# Patient Record
Sex: FEMALE | Race: WHITE | NOT HISPANIC OR LATINO | URBAN - METROPOLITAN AREA
[De-identification: names, ages, dates, MRNs, and addresses within clinical notes are randomized per-mention and may not be internally consistent; named-entity substitution may affect disease eponyms.]

---

## 2019-07-01 ENCOUNTER — INPATIENT (INPATIENT)
Facility: HOSPITAL | Age: 74
LOS: 0 days | Discharge: ANOTHER HOSPITAL | DRG: 305 | End: 2019-07-02
Attending: INTERNAL MEDICINE | Admitting: INTERNAL MEDICINE
Payer: COMMERCIAL

## 2019-07-01 VITALS
WEIGHT: 169.98 LBS | TEMPERATURE: 97 F | SYSTOLIC BLOOD PRESSURE: 177 MMHG | RESPIRATION RATE: 18 BRPM | OXYGEN SATURATION: 99 % | HEART RATE: 56 BPM | DIASTOLIC BLOOD PRESSURE: 80 MMHG

## 2019-07-01 DIAGNOSIS — I10 ESSENTIAL (PRIMARY) HYPERTENSION: ICD-10-CM

## 2019-07-01 DIAGNOSIS — I21.4 NON-ST ELEVATION (NSTEMI) MYOCARDIAL INFARCTION: ICD-10-CM

## 2019-07-01 DIAGNOSIS — E78.00 PURE HYPERCHOLESTEROLEMIA, UNSPECIFIED: ICD-10-CM

## 2019-07-01 DIAGNOSIS — Z29.9 ENCOUNTER FOR PROPHYLACTIC MEASURES, UNSPECIFIED: ICD-10-CM

## 2019-07-01 LAB
24R-OH-CALCIDIOL SERPL-MCNC: 23.5 NG/ML — LOW (ref 30–80)
ALBUMIN SERPL ELPH-MCNC: 4 G/DL — SIGNIFICANT CHANGE UP (ref 3.5–5)
ALP SERPL-CCNC: 107 U/L — SIGNIFICANT CHANGE UP (ref 40–120)
ALT FLD-CCNC: 25 U/L DA — SIGNIFICANT CHANGE UP (ref 10–60)
ANION GAP SERPL CALC-SCNC: 7 MMOL/L — SIGNIFICANT CHANGE UP (ref 5–17)
APTT BLD: 28.8 SEC — SIGNIFICANT CHANGE UP (ref 27.5–36.3)
AST SERPL-CCNC: 27 U/L — SIGNIFICANT CHANGE UP (ref 10–40)
BASOPHILS # BLD AUTO: 0.05 K/UL — SIGNIFICANT CHANGE UP (ref 0–0.2)
BASOPHILS NFR BLD AUTO: 0.8 % — SIGNIFICANT CHANGE UP (ref 0–2)
BILIRUB SERPL-MCNC: 0.3 MG/DL — SIGNIFICANT CHANGE UP (ref 0.2–1.2)
BUN SERPL-MCNC: 19 MG/DL — HIGH (ref 7–18)
CALCIUM SERPL-MCNC: 8.5 MG/DL — SIGNIFICANT CHANGE UP (ref 8.4–10.5)
CHLORIDE SERPL-SCNC: 105 MMOL/L — SIGNIFICANT CHANGE UP (ref 96–108)
CHOLEST SERPL-MCNC: 190 MG/DL — SIGNIFICANT CHANGE UP (ref 10–199)
CK MB BLD-MCNC: 6.4 % — HIGH (ref 0–3.5)
CK MB CFR SERPL CALC: 12.1 NG/ML — HIGH (ref 0–3.6)
CK SERPL-CCNC: 188 U/L — SIGNIFICANT CHANGE UP (ref 21–215)
CO2 SERPL-SCNC: 26 MMOL/L — SIGNIFICANT CHANGE UP (ref 22–31)
CREAT SERPL-MCNC: 0.91 MG/DL — SIGNIFICANT CHANGE UP (ref 0.5–1.3)
EOSINOPHIL # BLD AUTO: 0.09 K/UL — SIGNIFICANT CHANGE UP (ref 0–0.5)
EOSINOPHIL NFR BLD AUTO: 1.4 % — SIGNIFICANT CHANGE UP (ref 0–6)
GLUCOSE SERPL-MCNC: 103 MG/DL — HIGH (ref 70–99)
HBA1C BLD-MCNC: 5.5 % — SIGNIFICANT CHANGE UP (ref 4–5.6)
HCT VFR BLD CALC: 38 % — SIGNIFICANT CHANGE UP (ref 34.5–45)
HDLC SERPL-MCNC: 63 MG/DL — SIGNIFICANT CHANGE UP
HGB BLD-MCNC: 12.2 G/DL — SIGNIFICANT CHANGE UP (ref 11.5–15.5)
IMM GRANULOCYTES NFR BLD AUTO: 0.3 % — SIGNIFICANT CHANGE UP (ref 0–1.5)
INR BLD: 0.95 RATIO — SIGNIFICANT CHANGE UP (ref 0.88–1.16)
LIPID PNL WITH DIRECT LDL SERPL: 105 MG/DL — SIGNIFICANT CHANGE UP
LYMPHOCYTES # BLD AUTO: 1.37 K/UL — SIGNIFICANT CHANGE UP (ref 1–3.3)
LYMPHOCYTES # BLD AUTO: 21.3 % — SIGNIFICANT CHANGE UP (ref 13–44)
MCHC RBC-ENTMCNC: 30.4 PG — SIGNIFICANT CHANGE UP (ref 27–34)
MCHC RBC-ENTMCNC: 32.1 GM/DL — SIGNIFICANT CHANGE UP (ref 32–36)
MCV RBC AUTO: 94.8 FL — SIGNIFICANT CHANGE UP (ref 80–100)
MONOCYTES # BLD AUTO: 0.47 K/UL — SIGNIFICANT CHANGE UP (ref 0–0.9)
MONOCYTES NFR BLD AUTO: 7.3 % — SIGNIFICANT CHANGE UP (ref 2–14)
NEUTROPHILS # BLD AUTO: 4.43 K/UL — SIGNIFICANT CHANGE UP (ref 1.8–7.4)
NEUTROPHILS NFR BLD AUTO: 68.9 % — SIGNIFICANT CHANGE UP (ref 43–77)
NRBC # BLD: 0 /100 WBCS — SIGNIFICANT CHANGE UP (ref 0–0)
PLATELET # BLD AUTO: 190 K/UL — SIGNIFICANT CHANGE UP (ref 150–400)
POTASSIUM SERPL-MCNC: 3.8 MMOL/L — SIGNIFICANT CHANGE UP (ref 3.5–5.3)
POTASSIUM SERPL-SCNC: 3.8 MMOL/L — SIGNIFICANT CHANGE UP (ref 3.5–5.3)
PROT SERPL-MCNC: 7.2 G/DL — SIGNIFICANT CHANGE UP (ref 6–8.3)
PROTHROM AB SERPL-ACNC: 10.5 SEC — SIGNIFICANT CHANGE UP (ref 10–12.9)
RBC # BLD: 4.01 M/UL — SIGNIFICANT CHANGE UP (ref 3.8–5.2)
RBC # FLD: 12.5 % — SIGNIFICANT CHANGE UP (ref 10.3–14.5)
SODIUM SERPL-SCNC: 138 MMOL/L — SIGNIFICANT CHANGE UP (ref 135–145)
TOTAL CHOLESTEROL/HDL RATIO MEASUREMENT: 3 RATIO — LOW (ref 3.3–7.1)
TRIGL SERPL-MCNC: 109 MG/DL — SIGNIFICANT CHANGE UP (ref 10–149)
TROPONIN I SERPL-MCNC: 2.52 NG/ML — HIGH (ref 0–0.04)
TROPONIN I SERPL-MCNC: 3.51 NG/ML — HIGH (ref 0–0.04)
TSH SERPL-MCNC: 4.35 UU/ML — SIGNIFICANT CHANGE UP (ref 0.34–4.82)
VIT B12 SERPL-MCNC: 1311 PG/ML — HIGH (ref 232–1245)
WBC # BLD: 6.43 K/UL — SIGNIFICANT CHANGE UP (ref 3.8–10.5)
WBC # FLD AUTO: 6.43 K/UL — SIGNIFICANT CHANGE UP (ref 3.8–10.5)

## 2019-07-01 PROCEDURE — 71046 X-RAY EXAM CHEST 2 VIEWS: CPT | Mod: 26

## 2019-07-01 PROCEDURE — 99291 CRITICAL CARE FIRST HOUR: CPT

## 2019-07-01 RX ORDER — ASPIRIN/CALCIUM CARB/MAGNESIUM 324 MG
325 TABLET ORAL ONCE
Refills: 0 | Status: COMPLETED | OUTPATIENT
Start: 2019-07-01 | End: 2019-07-01

## 2019-07-01 RX ORDER — METOPROLOL TARTRATE 50 MG
12.5 TABLET ORAL EVERY 12 HOURS
Refills: 0 | Status: DISCONTINUED | OUTPATIENT
Start: 2019-07-01 | End: 2019-07-02

## 2019-07-01 RX ORDER — PANTOPRAZOLE SODIUM 20 MG/1
40 TABLET, DELAYED RELEASE ORAL
Refills: 0 | Status: DISCONTINUED | OUTPATIENT
Start: 2019-07-01 | End: 2019-07-02

## 2019-07-01 RX ORDER — SODIUM CHLORIDE 9 MG/ML
3 INJECTION INTRAMUSCULAR; INTRAVENOUS; SUBCUTANEOUS ONCE
Refills: 0 | Status: COMPLETED | OUTPATIENT
Start: 2019-07-01 | End: 2019-07-01

## 2019-07-01 RX ORDER — METOPROLOL TARTRATE 50 MG
12.5 TABLET ORAL DAILY
Refills: 0 | Status: DISCONTINUED | OUTPATIENT
Start: 2019-07-01 | End: 2019-07-01

## 2019-07-01 RX ORDER — ENOXAPARIN SODIUM 100 MG/ML
80 INJECTION SUBCUTANEOUS ONCE
Refills: 0 | Status: COMPLETED | OUTPATIENT
Start: 2019-07-01 | End: 2019-07-01

## 2019-07-01 RX ORDER — ATORVASTATIN CALCIUM 80 MG/1
40 TABLET, FILM COATED ORAL AT BEDTIME
Refills: 0 | Status: DISCONTINUED | OUTPATIENT
Start: 2019-07-01 | End: 2019-07-02

## 2019-07-01 RX ORDER — ASPIRIN/CALCIUM CARB/MAGNESIUM 324 MG
81 TABLET ORAL DAILY
Refills: 0 | Status: DISCONTINUED | OUTPATIENT
Start: 2019-07-01 | End: 2019-07-02

## 2019-07-01 RX ORDER — ENOXAPARIN SODIUM 100 MG/ML
80 INJECTION SUBCUTANEOUS
Refills: 0 | Status: DISCONTINUED | OUTPATIENT
Start: 2019-07-01 | End: 2019-07-01

## 2019-07-01 RX ADMIN — SODIUM CHLORIDE 3 MILLILITER(S): 9 INJECTION INTRAMUSCULAR; INTRAVENOUS; SUBCUTANEOUS at 04:34

## 2019-07-01 RX ADMIN — ENOXAPARIN SODIUM 80 MILLIGRAM(S): 100 INJECTION SUBCUTANEOUS at 11:07

## 2019-07-01 RX ADMIN — ENOXAPARIN SODIUM 80 MILLIGRAM(S): 100 INJECTION SUBCUTANEOUS at 04:38

## 2019-07-01 RX ADMIN — Medication 81 MILLIGRAM(S): at 11:06

## 2019-07-01 RX ADMIN — ATORVASTATIN CALCIUM 40 MILLIGRAM(S): 80 TABLET, FILM COATED ORAL at 21:54

## 2019-07-01 RX ADMIN — Medication 325 MILLIGRAM(S): at 04:37

## 2019-07-01 RX ADMIN — PANTOPRAZOLE SODIUM 40 MILLIGRAM(S): 20 TABLET, DELAYED RELEASE ORAL at 18:13

## 2019-07-01 RX ADMIN — Medication 12.5 MILLIGRAM(S): at 18:11

## 2019-07-01 NOTE — H&P ADULT - NEGATIVE OPHTHALMOLOGIC SYMPTOMS
no photophobia/no lacrimation R/no blurred vision L/no blurred vision R/no lacrimation L/no diplopia

## 2019-07-01 NOTE — H&P ADULT - NSHPSOCIALHISTORY_GEN_ALL_CORE
Pt lives alone at an apartment. Works as a home . She has never smoked cigarettes. She occasionally drinks alcohol.

## 2019-07-01 NOTE — ED PROVIDER NOTE - CLINICAL SUMMARY MEDICAL DECISION MAKING FREE TEXT BOX
Will check labs, ECG, CXR, cardiac enzymes, well appearing in NAD.  Will also check duplex of LLE given pain.

## 2019-07-01 NOTE — CONSULT NOTE ADULT - SUBJECTIVE AND OBJECTIVE BOX
Patient is a 73y old  Female who presents with a chief complaint of Chest pain (01 Jul 2019 11:59)    History of Present Illness:  Reason for Admission: Chest pain	  History of Present Illness: 	  Patient is a 74 y/o F  from home, with PMH of HTN, HLD, urinary incontinence who presented epigastric pain since last night. According to the patient she first noticed mild chest pain after dancing on a party which she ignored, but last night  pain was worse about 7/10 intensity without radiation along with some kind of funny sensation of chest and occasional numbness of left arm.  She also felt mild lightheadedness while trying to lie down on a bed. Pain is aggravated by walking and relieved by taking deep breath as per the patient. She mentioned that she has light headache, nausea  and generalized weakness. She denied shortness of breath, abdominal pain, vomiting, fever, chills.  No recent history of travel or immobilisation.  All other ROS negative except above mentioned.    pt seen in icu [  ], reg med floor [ x  ], bed [ x ], chair at bedside [   ], a+o x3 [ x ], lethargic [  ],  nad [x  ]    enriquez [  ], ngt [  ], peg [  ], et tube [  ], cent line [  ], picc line [  ]        Allergies    penicillin (Hives)        Vitals    T(F): 97.2 (07-01-19 @ 11:21), Max: 97.9 (07-01-19 @ 06:12)  HR: 57 (07-01-19 @ 11:21) (49 - 57)  BP: 134/63 (07-01-19 @ 11:21) (134/63 - 177/80)  RR: 17 (07-01-19 @ 11:21) (17 - 18)  SpO2: 100% (07-01-19 @ 11:21) (98% - 100%)  Wt(kg): --  CAPILLARY BLOOD GLUCOSE          Labs                          12.2   6.43  )-----------( 190      ( 01 Jul 2019 04:04 )             38.0       07-01    138  |  105  |  19<H>  ----------------------------<  103<H>  3.8   |  26  |  0.91    Ca    8.5      01 Jul 2019 04:05    TPro  7.2  /  Alb  4.0  /  TBili  0.3  /  DBili  x   /  AST  27  /  ALT  25  /  AlkPhos  107  07-01      CARDIAC MARKERS ( 01 Jul 2019 09:28 )  2.520 ng/mL / x     / 188 U/L / x     / 12.1 ng/mL  CARDIAC MARKERS ( 01 Jul 2019 04:05 )  3.510 ng/mL / x     / x     / x     / x          Troponin I, Serum (07.01.19 @ 09:28)    Troponin I, Serum: 2.520: The new reference range for Troponin-I performed on the Siemens Vista  system is 0.015-0.045 ng/mL, which includes the 99th percentile of a  healthy reference population. Studies have shown that elevated troponin  levels above the 99th percentile cutoff are associated with an increased  risk for adverse cardiac events, with the risk increasing as troponin  levels increase. As per a joint committee of the American College of  Cardiology and European Society of Cardiology, diagnosis of classic MI is  based upon the detection of a rise or fall of cardiac troponin values,  with at least one value above the 99th percentile upper reference limit,  in the appropriate clinical context.  Troponin-I (ng/mL) Interpretation  0.00-0.045 Normal range (includes the 99th percentile of a healthy  reference population)  >0.045 Elevated troponin level indicating increased risk  Note: Troponin-I and Troponin-T cannot be used interchangeably in serial  measurements. Minimally elevated Troponin results should be interpreted  in the context of clinical findings and risk factors. ng/mL    Troponin I, Serum (07.01.19 @ 04:05)    Troponin I, Serum: 3.510: TYPE:(C=Critical, N=Notification, A=Abnormal) _n  TESTS: _trop  DATE/TIME CALLED: _07/01/19 04:25  CALLED TO: _md karmen mao  READ BACK (2 Patient Identifiers)(Y/N): _y  READ BACK VALUES (Y/N): _y  CALLED BY: bobby claros07/01/19 04:25  The new reference range for Troponin-I performed on the Siemens Vista  system is 0.015-0.045 ng/mL, which includes the 99th percentile of a  healthy reference population. Studies have shown that elevated troponin  levels above the 99th percentile cutoff are associated with an increased  risk for adverse cardiac events, with the risk increasing as troponin  levels increase. As per a joint committee of the American College of  Cardiology and European Society of Cardiology, diagnosis of classic MI is  based upon the detection of a rise or fall of cardiac troponin values,  with at least one value above the 99th percentile upper reference limit,  in the appropriate clinical context.  Troponin-I (ng/mL) Interpretation  0.00-0.045 Normal range (includes the 99th percentile of a healthy  reference population)  >0.045 Elevated troponin level indicating increased risk  Note: Troponin-I and Troponin-T cannot be used interchangeably in serial  measurements. Minimally elevated Troponin results should be interpreted  in the context of clinical findings and risk factors. ng/mL          Radiology Results  < from: Xray Chest 2 Views PA/Lat (07.01.19 @ 04:26) >    IMPRESSION:  Clear lungs.       < end of copied text >      Meds    MEDICATIONS  (STANDING):  aspirin  chewable 81 milliGRAM(s) Oral daily  atorvastatin 40 milliGRAM(s) Oral at bedtime  enoxaparin Injectable 80 milliGRAM(s) SubCutaneous two times a day  metoprolol tartrate 12.5 milliGRAM(s) Oral every 12 hours  pantoprazole    Tablet 40 milliGRAM(s) Oral before breakfast      MEDICATIONS  (PRN):      Physical Exam    Neuro :  no focal deficits  Respiratory: CTA B/L  CV: RRR, S1S2, no murmurs,   Abdominal: Soft, NT, ND +BS,  Extremities: No edema, + peripheral pulses    ASSESSMENT    Non-ST elevation myocardial infarction (NSTEMI)  Hypertension  High cholesterol      PLAN  Monitor CE's   Troponin's elevated - trending down.   ACS protocol  Full dose Lovenox  Planning for cath in AM  cardio consult noted  continue tele monitoring  echo  ASA, Statin, BB  GI / DVT PPX

## 2019-07-01 NOTE — ED ADULT NURSE REASSESSMENT NOTE - NS ED NURSE REASSESS COMMENT FT1
730 am - pt transferred to holding area pt no signs of any distress nor discomfort pt airway patent denies any chest pain VSS. report given to CAS serna.

## 2019-07-01 NOTE — CONSULT NOTE ADULT - SUBJECTIVE AND OBJECTIVE BOX
CHIEF COMPLAINT:Patient is a 73y old  Female who presents with a chief complaint of Chest pain.      HPI:  Patient is a 72 y/o F  from home, with PMH of HTN, HLD, urinary incontinence who presented epigastric pain since last night. According to the patient she first noticed mild chest pain after dancing on a party which she ignored, but last night  pain was worse about 7/10 intensity without radiation along with some kind of funny sensation of chest and occasional numbness of left arm.  She also felt mild lightheadedness while trying to lie down on a bed. Pain is aggravated by walking and relieved by taking deep breath as per the patient. She mentioned that she has light headache, nausea  and generalized weakness. She denied shortness of breath, abdominal pain, vomiting, fever, chills.  No recent history of travel or immobilisation.  All other ROS negative except above mentioned. (01 Jul 2019 05:45)      PAST MEDICAL & SURGICAL HISTORY:  Hypertension  High cholesterol      MEDICATIONS  (STANDING):  aspirin  chewable 81 milliGRAM(s) Oral daily  atorvastatin 40 milliGRAM(s) Oral at bedtime  enoxaparin Injectable 80 milliGRAM(s) SubCutaneous two times a day  metoprolol tartrate 12.5 milliGRAM(s) Oral every 12 hours      FAMILY HISTORY: No family hx of CAD      SOCIAL HISTORY:    [x ] Non-smoker    [ x] Alcohol-denies    Allergies    penicillin (Hives)    Intolerances    	    REVIEW OF SYSTEMS:  CONSTITUTIONAL: No fever, weight loss, or fatigue  EYES: No eye pain, visual disturbances, or discharge  ENT:  No difficulty hearing, tinnitus, vertigo; No sinus or throat pain  NECK: No pain or stiffness  RESPIRATORY: No cough, wheezing, chills or hemoptysis; No Shortness of Breath  CARDIOVASCULAR: + chest pain, No palpitations, passing out, dizziness, or leg swelling  GASTROINTESTINAL: No abdominal or epigastric pain. No nausea, vomiting, or hematemesis; No diarrhea or constipation. No melena or hematochezia.  GENITOURINARY: No dysuria, frequency, hematuria, or incontinence  NEUROLOGICAL: No headaches, memory loss, loss of strength, numbness, or tremors  SKIN: No itching, burning, rashes, or lesions   LYMPH Nodes: No enlarged glands  ENDOCRINE: No heat or cold intolerance; No hair loss  MUSCULOSKELETAL: No joint pain or swelling; No muscle, back, or extremity pain  PSYCHIATRIC: No depression, anxiety, mood swings, or difficulty sleeping  HEME/LYMPH: No easy bruising, or bleeding gums  ALLERGY AND IMMUNOLOGIC: No hives or eczema	      PHYSICAL EXAM:  T(C): 36.2 (07-01-19 @ 11:21), Max: 36.6 (07-01-19 @ 06:12)  HR: 57 (07-01-19 @ 11:21) (49 - 57)  BP: 134/63 (07-01-19 @ 11:21) (134/63 - 177/80)  RR: 17 (07-01-19 @ 11:21) (17 - 18)  SpO2: 100% (07-01-19 @ 11:21) (98% - 100%)        Appearance: Normal	  HEENT:   Normal oral mucosa, PERRL, EOMI	  Lymphatic: No lymphadenopathy  Cardiovascular: Normal S1 S2, No JVD, No murmurs, No edema  Respiratory: Lungs clear to auscultation	  Psychiatry: A & O x 3, Mood & affect appropriate  Gastrointestinal:  Soft, Non-tender, + BS	  Skin: No rashes, No ecchymoses, No cyanosis	  Neurologic: Non-focal  Extremities: Normal range of motion, No clubbing, cyanosis or edema  Vascular: Peripheral pulses palpable 2+ bilaterally        ECG:  	nsr,nl axis    	  LABS:	 	    CARDIAC MARKERS:  CARDIAC MARKERS ( 01 Jul 2019 09:28 )  2.520 ng/mL / x     / 188 U/L / x     / 12.1 ng/mL  CARDIAC MARKERS ( 01 Jul 2019 04:05 )  3.510 ng/mL / x     / x     / x     / x                            12.2   6.43  )-----------( 190      ( 01 Jul 2019 04:04 )             38.0     07-01    138  |  105  |  19<H>  ----------------------------<  103<H>  3.8   |  26  |  0.91    Ca    8.5      01 Jul 2019 04:05    TPro  7.2  /  Alb  4.0  /  TBili  0.3  /  DBili  x   /  AST  27  /  ALT  25  /  AlkPhos  107  07-01      Lipid Profile: Cholesterol 190    HDL 63      TSH: Thyroid Stimulating Hormone, Serum: 4.35 uU/mL (07-01 @ 10:07)      PREVIOUS DIAGNOSTIC TESTING:    [ ] Echocardiogram:  [ ]  Catheterization:  [ ] Stress Test:

## 2019-07-01 NOTE — ED PROVIDER NOTE - OBJECTIVE STATEMENT
73 F with hx of high cholesterol complaining of chest pain to L side and lightheadedness starting yesterday.  No vomiting.  Patient also complaining of L leg pain.  No shortness of breath.  No abdominal pain.  No other complaints.

## 2019-07-01 NOTE — H&P ADULT - PROBLEM SELECTOR PLAN 1
Troponin T1 elevated to 3.510  Monitor serial cardiac enzymes  S/P Lovenox 80 mg at ED  Continue Atorvastatin, aspirin and metoprolol  Continue full dose lovenox Troponin T1 elevated to 3.510  EKG shows NSR  Monitor serial cardiac enzymes  S/P Lovenox 80 mg at ED  Continue Atorvastatin, aspirin and metoprolol  Continue full dose lovenox Troponin T1 elevated to 3.510  EKG shows NSR  Monitor serial cardiac enzymes  S/P Lovenox 80 mg at ED  Continue Atorvastatin, aspirin and metoprolol  Continue full dose lovenox  F/u Echocardiogram  Cardiology Dr. Wells

## 2019-07-01 NOTE — H&P ADULT - PROBLEM SELECTOR PLAN 4
IMPROVE VTE Individual Risk Assessment   RISK                                                          Points  [  ] Previous VTE                                                3  [  ] Thrombophilia                                             2  [  ] Lower limb paralysis                                    2        (unable to hold up >15 seconds)    [  ] Current Cancer                                             2         (within 6 months)  [  x] Immobilization > 24 hrs                              1  [  ] ICU/CCU stay > 24 hours                            1  [x  ] Age > 60                                                    1  IMPROVE VTE Score ___2______  Full dose Lovenox

## 2019-07-01 NOTE — ED ADULT NURSE NOTE - OBJECTIVE STATEMENT
came to ED due to chest pain noted last saturday and again woke up with pain and chest tightness. Denies dizziness or shortness of breath . Seen and examined by Dr Martínez.

## 2019-07-01 NOTE — H&P ADULT - HISTORY OF PRESENT ILLNESS
Patient is a 72 y/o F  from home, with PMH of HTN, HLD who presented epigastric pain since last night. According to the patient she first noticed mild chest pain after dancing on a party which she ignored, but last night  pain was worse about 7/10 intensity without radiation along with some kind of funny sensation of chest and occasional numbness of left arm.  She also felt mild lightheadedness while trying to lie down on a bed. Pain is aggravated by walking and relieved by taking deep breath as per the patient. She mentioned that she has light headache, nausea  and generalized weakness. She denied shortness of breath, abdominal pain, vomiting, fever, chills.  No recent history of travel or immobilisation Patient is a 74 y/o F  from home, with PMH of HTN, HLD, urinary incontinence who presented epigastric pain since last night. According to the patient she first noticed mild chest pain after dancing on a party which she ignored, but last night  pain was worse about 7/10 intensity without radiation along with some kind of funny sensation of chest and occasional numbness of left arm.  She also felt mild lightheadedness while trying to lie down on a bed. Pain is aggravated by walking and relieved by taking deep breath as per the patient. She mentioned that she has light headache, nausea  and generalized weakness. She denied shortness of breath, abdominal pain, vomiting, fever, chills.  No recent history of travel or immobilisation.  All other ROS negative except above mentioned.

## 2019-07-01 NOTE — H&P ADULT - ASSESSMENT
Patient is a 72 y/o F  from home, with PMH of HTN, HLD, urinary incontinence who presented epigastric pain since last night. Patient is being admitted to telemetry floor for NSTEMI.                          12.2   6.43  )-----------( 190      ( 01 Jul 2019 04:04 )             38.0     07-01    138  |  105  |  19<H>  ----------------------------<  103<H>  3.8   |  26  |  0.91    Ca    8.5      01 Jul 2019 04:05    TPro  7.2  /  Alb  4.0  /  TBili  0.3  /  DBili  x   /  AST  27  /  ALT  25  /  AlkPhos  107  07-01      ED course: Vitals:   S/p Full dose lovenox and aspirin 325 mg at ED Patient is a 74 y/o F  from home, with PMH of HTN, HLD, urinary incontinence who presented epigastric pain since last night. Patient is being admitted to telemetry floor for NSTEMI.                          12.2   6.43  )-----------( 190      ( 01 Jul 2019 04:04 )             38.0     07-01    138  |  105  |  19<H>  ----------------------------<  103<H>  3.8   |  26  |  0.91    Ca    8.5      01 Jul 2019 04:05    TPro  7.2  /  Alb  4.0  /  TBili  0.3  /  DBili  x   /  AST  27  /  ALT  25  /  AlkPhos  107  07-01      ED course: Vitals: B.P: 177/80, T:97.4, RR:18, SPO2:99%  S/p Full dose lovenox and aspirin 325 mg at ED

## 2019-07-01 NOTE — CONSULT NOTE ADULT - ASSESSMENT
72 y/o F  from home, with PMH of HTN, HLD, urinary incontinence who presented epigastric pain since last night. According to the patient she first noticed mild chest pain after dancing on a party which she ignored, but last night  pain was worse about 7/10 intensity without radiation along with some kind of funny sensation of chest and occasional numbness of left arm, NSTEMI-Type I.  1.Tele monitoring.  2.Echocardiogram.  3.Full dose lovenox.  4.Cont asa,b blocker,statin.  5.D/W pt option of invasive vs non-invasive strategy, agree for cath in AM.

## 2019-07-01 NOTE — H&P ADULT - PROBLEM SELECTOR PLAN 2
Pt has h/o HTN  Pt does not remember her home meds  Continue metoprolol 12.5 BID for now  Monitor blood pressure

## 2019-07-02 ENCOUNTER — OUTPATIENT (OUTPATIENT)
Dept: INPATIENT UNIT | Facility: HOSPITAL | Age: 74
LOS: 1 days | Discharge: HOME | End: 2019-07-02
Payer: MEDICARE

## 2019-07-02 VITALS
DIASTOLIC BLOOD PRESSURE: 65 MMHG | SYSTOLIC BLOOD PRESSURE: 106 MMHG | RESPIRATION RATE: 18 BRPM | OXYGEN SATURATION: 95 % | TEMPERATURE: 98 F | HEART RATE: 49 BPM

## 2019-07-02 DIAGNOSIS — I10 ESSENTIAL (PRIMARY) HYPERTENSION: ICD-10-CM

## 2019-07-02 DIAGNOSIS — I21.4 NON-ST ELEVATION (NSTEMI) MYOCARDIAL INFARCTION: ICD-10-CM

## 2019-07-02 DIAGNOSIS — Z98.890 OTHER SPECIFIED POSTPROCEDURAL STATES: Chronic | ICD-10-CM

## 2019-07-02 DIAGNOSIS — E78.00 PURE HYPERCHOLESTEROLEMIA, UNSPECIFIED: ICD-10-CM

## 2019-07-02 LAB
ANION GAP SERPL CALC-SCNC: 12 MMO/L — SIGNIFICANT CHANGE UP (ref 7–14)
BUN SERPL-MCNC: 17 MG/DL — SIGNIFICANT CHANGE UP (ref 7–23)
CALCIUM SERPL-MCNC: 9.8 MG/DL — SIGNIFICANT CHANGE UP (ref 8.4–10.5)
CHLORIDE SERPL-SCNC: 105 MMOL/L — SIGNIFICANT CHANGE UP (ref 98–107)
CO2 SERPL-SCNC: 25 MMOL/L — SIGNIFICANT CHANGE UP (ref 22–31)
CREAT SERPL-MCNC: 0.91 MG/DL — SIGNIFICANT CHANGE UP (ref 0.5–1.3)
GLUCOSE SERPL-MCNC: 97 MG/DL — SIGNIFICANT CHANGE UP (ref 70–99)
HCT VFR BLD CALC: 38.7 % — SIGNIFICANT CHANGE UP (ref 34.5–45)
HCV AB S/CO SERPL IA: 0.08 S/CO — SIGNIFICANT CHANGE UP (ref 0–0.99)
HCV AB SERPL-IMP: SIGNIFICANT CHANGE UP
HGB BLD-MCNC: 12.5 G/DL — SIGNIFICANT CHANGE UP (ref 11.5–15.5)
MCHC RBC-ENTMCNC: 30.3 PG — SIGNIFICANT CHANGE UP (ref 27–34)
MCHC RBC-ENTMCNC: 32.3 % — SIGNIFICANT CHANGE UP (ref 32–36)
MCV RBC AUTO: 93.7 FL — SIGNIFICANT CHANGE UP (ref 80–100)
NRBC # FLD: 0 K/UL — SIGNIFICANT CHANGE UP (ref 0–0)
PLATELET # BLD AUTO: 190 K/UL — SIGNIFICANT CHANGE UP (ref 150–400)
PMV BLD: 10.6 FL — SIGNIFICANT CHANGE UP (ref 7–13)
POTASSIUM SERPL-MCNC: 4.2 MMOL/L — SIGNIFICANT CHANGE UP (ref 3.5–5.3)
POTASSIUM SERPL-SCNC: 4.2 MMOL/L — SIGNIFICANT CHANGE UP (ref 3.5–5.3)
RBC # BLD: 4.13 M/UL — SIGNIFICANT CHANGE UP (ref 3.8–5.2)
RBC # FLD: 12.7 % — SIGNIFICANT CHANGE UP (ref 10.3–14.5)
SODIUM SERPL-SCNC: 142 MMOL/L — SIGNIFICANT CHANGE UP (ref 135–145)
WBC # BLD: 4.74 K/UL — SIGNIFICANT CHANGE UP (ref 3.8–10.5)
WBC # FLD AUTO: 4.74 K/UL — SIGNIFICANT CHANGE UP (ref 3.8–10.5)

## 2019-07-02 PROCEDURE — 82553 CREATINE MB FRACTION: CPT

## 2019-07-02 PROCEDURE — 85027 COMPLETE CBC AUTOMATED: CPT

## 2019-07-02 PROCEDURE — 93005 ELECTROCARDIOGRAM TRACING: CPT

## 2019-07-02 PROCEDURE — 99291 CRITICAL CARE FIRST HOUR: CPT | Mod: 25

## 2019-07-02 PROCEDURE — 84443 ASSAY THYROID STIM HORMONE: CPT

## 2019-07-02 PROCEDURE — 80061 LIPID PANEL: CPT

## 2019-07-02 PROCEDURE — 85730 THROMBOPLASTIN TIME PARTIAL: CPT

## 2019-07-02 PROCEDURE — 85610 PROTHROMBIN TIME: CPT

## 2019-07-02 PROCEDURE — 71046 X-RAY EXAM CHEST 2 VIEWS: CPT

## 2019-07-02 PROCEDURE — 96372 THER/PROPH/DIAG INJ SC/IM: CPT

## 2019-07-02 PROCEDURE — 80053 COMPREHEN METABOLIC PANEL: CPT

## 2019-07-02 PROCEDURE — 93010 ELECTROCARDIOGRAM REPORT: CPT

## 2019-07-02 PROCEDURE — 36415 COLL VENOUS BLD VENIPUNCTURE: CPT

## 2019-07-02 PROCEDURE — 86803 HEPATITIS C AB TEST: CPT

## 2019-07-02 PROCEDURE — 82306 VITAMIN D 25 HYDROXY: CPT

## 2019-07-02 PROCEDURE — 84484 ASSAY OF TROPONIN QUANT: CPT

## 2019-07-02 PROCEDURE — 83036 HEMOGLOBIN GLYCOSYLATED A1C: CPT

## 2019-07-02 PROCEDURE — 82607 VITAMIN B-12: CPT

## 2019-07-02 PROCEDURE — 93306 TTE W/DOPPLER COMPLETE: CPT

## 2019-07-02 PROCEDURE — 82550 ASSAY OF CK (CPK): CPT

## 2019-07-02 RX ORDER — PANTOPRAZOLE SODIUM 20 MG/1
40 TABLET, DELAYED RELEASE ORAL
Refills: 0 | Status: DISCONTINUED | OUTPATIENT
Start: 2019-07-02 | End: 2019-07-02

## 2019-07-02 RX ORDER — ATORVASTATIN CALCIUM 80 MG/1
40 TABLET, FILM COATED ORAL AT BEDTIME
Refills: 0 | Status: DISCONTINUED | OUTPATIENT
Start: 2019-07-02 | End: 2019-07-02

## 2019-07-02 RX ORDER — METOPROLOL TARTRATE 50 MG
12.5 TABLET ORAL
Refills: 0 | Status: DISCONTINUED | OUTPATIENT
Start: 2019-07-02 | End: 2019-07-02

## 2019-07-02 RX ORDER — METOPROLOL TARTRATE 50 MG
12.5 TABLET ORAL EVERY 12 HOURS
Refills: 0 | Status: DISCONTINUED | OUTPATIENT
Start: 2019-07-02 | End: 2019-07-02

## 2019-07-02 RX ORDER — ASPIRIN/CALCIUM CARB/MAGNESIUM 324 MG
81 TABLET ORAL ONCE
Refills: 0 | Status: COMPLETED | OUTPATIENT
Start: 2019-07-02 | End: 2019-07-02

## 2019-07-02 RX ORDER — SODIUM CHLORIDE 9 MG/ML
3 INJECTION INTRAMUSCULAR; INTRAVENOUS; SUBCUTANEOUS EVERY 8 HOURS
Refills: 0 | Status: DISCONTINUED | OUTPATIENT
Start: 2019-07-02 | End: 2019-07-02

## 2019-07-02 RX ORDER — AMLODIPINE BESYLATE 2.5 MG/1
1 TABLET ORAL
Qty: 30 | Refills: 0
Start: 2019-07-02 | End: 2019-07-31

## 2019-07-02 RX ADMIN — PANTOPRAZOLE SODIUM 40 MILLIGRAM(S): 20 TABLET, DELAYED RELEASE ORAL at 06:28

## 2019-07-02 RX ADMIN — Medication 81 MILLIGRAM(S): at 11:46

## 2019-07-02 RX ADMIN — Medication 12.5 MILLIGRAM(S): at 06:29

## 2019-07-02 NOTE — TRANSFER ACCEPTANCE NOTE - ASSESSMENT
72 y/o F  from home, with PMH of HTN, HLD, urinary incontinence who presented to [facility] ED 7/1/19 complaining of epigastric pain for 1 day, R/I NSTEMI and transferred for cath.

## 2019-07-02 NOTE — ACUTE INTERFACILITY TRANSFER NOTE - PLAN OF CARE
Cardiac catheterization Admitted to [facility] with diagnosis of NSTEMI with elevated cardiac enzymes. Transferring to [facility] for cardiac catheterization. Continue current antihypertensives. Continue current statin medication.

## 2019-07-02 NOTE — DISCHARGE NOTE PROVIDER - CARE PROVIDER_API CALL
neal molina  Phone: [phone]  Fax: (   )    -  Follow Up Time:     CORTNEY wells lucia  Phone: [phone]  Fax: (   )    -  Follow Up Time:

## 2019-07-02 NOTE — TRANSFER ACCEPTANCE NOTE - SKIN COMMENTS
bruising on abdomen and arms on admit suspected from blood draws/lovenox injections at [facility]

## 2019-07-02 NOTE — TRANSFER ACCEPTANCE NOTE - PMH
High cholesterol    Hypertension    NSTEMI (non-ST elevated myocardial infarction)    Urinary incontinence

## 2019-07-02 NOTE — DISCHARGE NOTE PROVIDER - PROVIDER TOKENS
FREE:[LAST:[tracy],FIRST:[neal],PHONE:[phone]],FAX:[(   )    -]],FREE:[LAST:[CORTNEY wells],FIRST:[linda],PHONE:[phone]:[(   )    -]]

## 2019-07-02 NOTE — TRANSFER ACCEPTANCE NOTE - PROBLEM SELECTOR PLAN 1
cardiac cath  tele  ASA cardiac cath  tele  ASA, metoprolol 12.5mg po BID and lipitor 40mg po bedtime    unable to insert meds above takes at home atorvastatin and

## 2019-07-02 NOTE — PROGRESS NOTE ADULT - ASSESSMENT
72 y/o F  from home, with PMH of HTN, HLD, urinary incontinence who presented epigastric pain since last night. According to the patient she first noticed mild chest pain after dancing on a party which she ignored, but last night  pain was worse about 7/10 intensity without radiation along with some kind of funny sensation of chest and occasional numbness of left arm, NSTEMI-Type I.  1.Tele monitoring.  2.Echocardiogram.  3.pt agree for transfer for cardiac cath today at [facility].  4.Cont asa,b blocker,statin.  5.PPI.

## 2019-07-02 NOTE — DISCHARGE NOTE PROVIDER - HOSPITAL COURSE
74 y/o F  from home, with PMH of HTN, HLD, urinary incontinence who presented to [facility] ED 7/1/19 complaining of epigastric pain for 1 week prior to admission. Patient explains that she underwent a stressful event 1 week ago when she was in an elevator and it "dropped down". She went home and thru the week felt worsening daily chest pain that prompted her to come to the ED. R/I NSTEMI with peak troponin I 3.5 and admitted for ACS. Patient started on ASA and Statin therapy.     Underwent an Echo revealing EF 55%<  Mitral annular calcification. Mild mitral regurgitation. Mild aortic regurgitation. Normal left ventricular internal dimensions and wall thicknesses.    Endocardium not well visualized; grossly normal left ventricular systolic function. Mild diastolic dysfunction (stage I). Normal right ventricular size and function.    In light of patients cardiac risk factors, symptoms and abnormal noninvasive test findings there is high suspicion for CAD. Patient is now transferred to [facility] 7/2/19 for a cardiac catheterization with possible PTCA/stent.         Underwent a Cardiac catheterization via right radial artery access revealing luminal irregularities. Patient noted to have asymptomatic bradycardia during admission on tele with HR 40-50, she did get her Metoprolol 12.5mg po BID x1 dose this morning at [facility] and is on Diltiazem at home.     Will stop metropol therapy and will recommend discontinuation of Diltiazem PO therapy. Will start low dose Norvasc 5mg po daily for BP control at home. Patient advised to follow up with her PMD in 1 week or earlier if needed     radial site remains stable and once band removed if remans stable will be able to d/c home today with outpatient follow up.

## 2019-07-02 NOTE — PROGRESS NOTE ADULT - SUBJECTIVE AND OBJECTIVE BOX
CHIEF COMPLAINT:Patient is a 73y old  Female who presents with a chief complaint of Chest pain.Pt appears comfortable.    	  REVIEW OF SYSTEMS:  CONSTITUTIONAL: No fever, weight loss, or fatigue  EYES: No eye pain, visual disturbances, or discharge  ENT:  No difficulty hearing, tinnitus, vertigo; No sinus or throat pain  NECK: No pain or stiffness  RESPIRATORY: No cough, wheezing, chills or hemoptysis; No Shortness of Breath  CARDIOVASCULAR: No chest pain, palpitations, passing out, dizziness, or leg swelling  GASTROINTESTINAL: No abdominal or epigastric pain. No nausea, vomiting, or hematemesis; No diarrhea or constipation. No melena or hematochezia.  GENITOURINARY: No dysuria, frequency, hematuria, or incontinence  NEUROLOGICAL: No headaches, memory loss, loss of strength, numbness, or tremors  SKIN: No itching, burning, rashes, or lesions   LYMPH Nodes: No enlarged glands  ENDOCRINE: No heat or cold intolerance; No hair loss  MUSCULOSKELETAL: No joint pain or swelling; No muscle, back, or extremity pain  PSYCHIATRIC: No depression, anxiety, mood swings, or difficulty sleeping  HEME/LYMPH: No easy bruising, or bleeding gums  ALLERGY AND IMMUNOLOGIC: No hives or eczema	      PHYSICAL EXAM:  T(C): 36.6 (07-02-19 @ 04:19), Max: 37.1 (07-01-19 @ 23:34)  HR: 56 (07-02-19 @ 06:13) (50 - 61)  BP: 135/58 (07-02-19 @ 06:13) (133/61 - 159/57)  RR: 18 (07-02-19 @ 04:19) (17 - 18)  SpO2: 100% (07-02-19 @ 04:19) (98% - 100%)    I&O's Summary    01 Jul 2019 07:01  -  02 Jul 2019 07:00  --------------------------------------------------------  IN: 200 mL / OUT: 0 mL / NET: 200 mL        Appearance: Normal	  HEENT:   Normal oral mucosa, PERRL, EOMI	  Lymphatic: No lymphadenopathy  Cardiovascular: Normal S1 S2, No JVD, No murmurs, No edema  Respiratory: Lungs clear to auscultation	  Psychiatry: A & O x 3, Mood & affect appropriate  Gastrointestinal:  Soft, Non-tender, + BS	  Skin: No rashes, No ecchymoses, No cyanosis	  Neurologic: Non-focal  Extremities: Normal range of motion, No clubbing, cyanosis or edema  Vascular: Peripheral pulses palpable 2+ bilaterally    MEDICATIONS  (STANDING):  aspirin  chewable 81 milliGRAM(s) Oral daily  atorvastatin 40 milliGRAM(s) Oral at bedtime  metoprolol tartrate 12.5 milliGRAM(s) Oral every 12 hours  pantoprazole    Tablet 40 milliGRAM(s) Oral before breakfast      TELEMETRY: 	nsr 40's      	  LABS:	 	    CARDIAC MARKERS ( 01 Jul 2019 09:28 )  2.520 ng/mL / x     / 188 U/L / x     / 12.1 ng/mL  CARDIAC MARKERS ( 01 Jul 2019 04:05 )  3.510 ng/mL / x     / x     / x     / x                               12.2   6.43  )-----------( 190      ( 01 Jul 2019 04:04 )             38.0     07-01    138  |  105  |  19<H>  ----------------------------<  103<H>  3.8   |  26  |  0.91    Ca    8.5      01 Jul 2019 04:05    TPro  7.2  /  Alb  4.0  /  TBili  0.3  /  DBili  x   /  AST  27  /  ALT  25  /  AlkPhos  107  07-01      Lipid Profile: Cholesterol 190    HDL 63      HgA1c: Hemoglobin A1C, Whole Blood: 5.5 % (07-01 @ 15:06)    TSH: Thyroid Stimulating Hormone, Serum: 4.35 uU/mL (07-01 @ 10:07)
pt seen in icu [  ], reg med floor [ x  ], bed [x  ], chair at bedside [   ]    Awake , alert , lying in bed in NAD.    REVIEW OF SYSTEMS:    CONSTITUTIONAL: No weakness, fevers or chills  EYES/ENT: No visual changes;  No vertigo or throat pain   NECK: No pain or stiffness  RESPIRATORY: No cough, wheezing, hemoptysis; No shortness of breath  CARDIOVASCULAR: No chest pain or palpitations  GASTROINTESTINAL: No abdominal or epigastric pain. No nausea, vomiting, or hematemesis; No diarrhea or constipation. No melena or hematochezia.  GENITOURINARY: No dysuria, frequency or hematuria  NEUROLOGICAL: No numbness or weakness  SKIN: No itching, burning, rashes, or lesions   All other review of systems is negative unless indicated above.    Physical Exam    General: WN/WD NAD  Neurology: A&Ox3, nonfocal, BROWN x 4  Respiratory: CTA B/L  CV: RRR, S1S2, no murmurs, rubs or gallops  Abdominal: Soft, NT, ND +BS, Last BM  Extremities: No edema, + peripheral pulses      Allergies  penicillin (Hives)      Health Issues  Non-ST elevation myocardial infarction (NSTEMI)  Hypertension  High cholesterol      Vitals  T(F): 98.2 (07-02-19 @ 07:51), Max: 98.7 (07-01-19 @ 23:34)  HR: 49 (07-02-19 @ 07:51) (49 - 61)  BP: 106/65 (07-02-19 @ 07:51) (106/65 - 159/57)  RR: 18 (07-02-19 @ 07:51) (17 - 18)  SpO2: 95% (07-02-19 @ 07:51) (95% - 100%)  Wt(kg): --  CAPILLARY BLOOD GLUCOSE          Labs                          12.2   6.43  )-----------( 190      ( 01 Jul 2019 04:04 )             38.0       07-01    138  |  105  |  19<H>  ----------------------------<  103<H>  3.8   |  26  |  0.91    Ca    8.5      01 Jul 2019 04:05    TPro  7.2  /  Alb  4.0  /  TBili  0.3  /  DBili  x   /  AST  27  /  ALT  25  /  AlkPhos  107  07-01      CARDIAC MARKERS ( 01 Jul 2019 09:28 )  2.520 ng/mL / x     / 188 U/L / x     / 12.1 ng/mL  CARDIAC MARKERS ( 01 Jul 2019 04:05 )  3.510 ng/mL / x     / x     / x     / x          < from: 12 Lead ECG (07.01.19 @ 02:06) >  Ventricular Rate 66 BPM    Atrial Rate 66 BPM    P-R Interval 124 ms    QRS Duration 92 ms    Q-T Interval 430 ms    QTC Calculation(Bezet) 450 ms    P Axis 66 degrees    R Axis 39 degrees    T Axis 39 degrees    Diagnosis Line Normal sinus rhythm  Normal ECG    Confirmed by BASIL LEACH, HARDY ([id number]) on 7/1/2019 3:38:47 PM    < end of copied text >    Radiology Results    < from: Xray Chest 2 Views PA/Lat (07.01.19 @ 04:26) >  IMPRESSION:  Clear lungs.     < end of copied text >      Meds    MEDICATIONS  (STANDING):  aspirin  chewable 81 milliGRAM(s) Oral daily  atorvastatin 40 milliGRAM(s) Oral at bedtime  metoprolol tartrate 12.5 milliGRAM(s) Oral every 12 hours  pantoprazole    Tablet 40 milliGRAM(s) Oral before breakfast      MEDICATIONS  (PRN):

## 2019-07-02 NOTE — DISCHARGE NOTE NURSING/CASE MANAGEMENT/SOCIAL WORK - NSDCDPATPORTLINK_GEN_ALL_CORE
You can access the [facility] Portal, offered by [facility], by registering with the following website: [email or web address]

## 2019-07-02 NOTE — PROGRESS NOTE ADULT - PROBLEM SELECTOR PLAN 1
Troponin's elevated - trending down.   ACS protocol  tele monitoring  echo  ASA, Statin, BB  Cardio follow up Troponin's elevated - trending down.   ACS protocol  tele monitoring  echo  ASA, Statin, BB  Cardio follow up  For cardiac cath today

## 2019-07-02 NOTE — DISCHARGE NOTE PROVIDER - NSDCCPCAREPLAN_GEN_ALL_CORE_FT
PRINCIPAL DISCHARGE DIAGNOSIS  Diagnosis: Chest pain, unspecified type  Assessment and Plan of Treatment:

## 2019-07-02 NOTE — DISCHARGE NOTE PROVIDER - NSDCFUADDAPPT_GEN_ALL_CORE_FT
FOllow up with CORTNEY Gabriel in 1week or earlier if needed  Stop Your diltiazem as your heart rate goes low  Start Norvasc 5mg oral daily

## 2019-07-02 NOTE — TRANSFER ACCEPTANCE NOTE - HISTORY OF PRESENT ILLNESS
72 y/o F  from home, with PMH of HTN, HLD, urinary incontinence who presented to [facility] ED 7/1/19 complaining of epigastric pain for 1 day, R/I NSTEMI with peak troponin 3.5 and admitted for ACS. Patient started on .   Underwent an Echo revealing EF 55%<  Mitral annular calcification. Mild mitral regurgitation. Mild aortic regurgitation. Normal left ventricular internal dimensions and wall thicknesses.  Endocardium not well visualized; grossly normal left ventricular systolic function. Mild diastolic dysfunction (stage I). Normal right ventricular size and function.  In light of patients cardiac risk factors, symptoms and abnormal noninvasive test findings there is high suspicion for CAD. Patient is now transferred to [facility] 7/2/19 for a cardiac catheterization with possible PTCA/stent. 74 y/o F  from home, with PMH of HTN, HLD, urinary incontinence who presented to [facility] ED 7/1/19 complaining of epigastric pain for 1 day, R/I NSTEMI with peak troponin 3.5 and admitted for ACS. Patient started on .   Underwent an Echo revealing EF 55%<  Mitral annular calcification. Mild mitral regurgitation. Mild aortic regurgitation. Normal left ventricular internal dimensions and wall thicknesses.  Endocardium not well visualized; grossly normal left ventricular systolic function. Mild diastolic dysfunction (stage I). Normal right ventricular size and function.  In light of patients cardiac risk factors, symptoms and abnormal noninvasive test findings there is high suspicion for CAD. Patient is now transferred to [facility] 7/2/19 for a cardiac catheterization with possible PTCA/stent.     tuesday to sunday felt poorly and sweating with chst pain and gfot worse   decided to go to ED for 72 y/o F  from home, with PMH of HTN, HLD, urinary incontinence who presented to [facility] ED 7/1/19 complaining of epigastric pain for 1 week prior to admission. Patient explains that she underwent a stressful event 1 week ago when she was in an elevator and it "dropped down". She went home and thru the week felt worsening daily chest pain that prompted her to come to the ED. R/I NSTEMI with peak troponin I 3.5 and admitted for ACS. Patient started on ASA and Statin therapy.   Underwent an Echo revealing EF 55%<  Mitral annular calcification. Mild mitral regurgitation. Mild aortic regurgitation. Normal left ventricular internal dimensions and wall thicknesses.  Endocardium not well visualized; grossly normal left ventricular systolic function. Mild diastolic dysfunction (stage I). Normal right ventricular size and function.  In light of patients cardiac risk factors, symptoms and abnormal noninvasive test findings there is high suspicion for CAD. Patient is now transferred to [facility] 7/2/19 for a cardiac catheterization with possible PTCA/stent.

## 2019-07-02 NOTE — ACUTE INTERFACILITY TRANSFER NOTE - HOSPITAL COURSE
Patient is a 74 y/o F  from home, with PMH of HTN, HLD, urinary incontinence who presented epigastric pain since last night. According to the patient she first noticed mild chest pain after dancing on a party which she ignored, but last night  pain was worse about 7/10 intensity without radiation along with some kind of funny sensation of chest and occasional numbness of left arm.  She also felt mild lightheadedness while trying to lie down on a bed. Pain is aggravated by walking and relieved by taking deep breath as per the patient. She mentioned that she has light headache, nausea  and generalized weakness. She denied shortness of breath, abdominal pain, vomiting, fever, chills.  No recent history of travel or immobilisation.  All other ROS negative except above mentioned.    Patient admitted for NTEMI with elevated cardiac enzymes. Transferring to [facility] for cardiac cath - accepting physician Dr. Dior.

## 2019-07-02 NOTE — TRANSFER ACCEPTANCE NOTE - SOURCE OF INFORMATION, PROFILE
chart(s)/reliable and medical records/patient chart(s)/reliable via [place]  #[id number] and medical records/patient

## 2019-07-02 NOTE — DISCHARGE NOTE PROVIDER - NSDCFUADDINST_GEN_ALL_CORE_FT
see attached instruction sheet    No heavy lifting greater than 5-10 pounds x one week.  No strenuous activity x 3 weeks.  Monitor site of procedure and notify your doctor for any redness/swelling/discharge.

## 2019-07-06 RX ORDER — EZETIMIBE 10 MG/1
1 TABLET ORAL
Qty: 0 | Refills: 0 | DISCHARGE

## 2019-07-06 RX ORDER — OMEPRAZOLE 10 MG/1
1 CAPSULE, DELAYED RELEASE ORAL
Qty: 0 | Refills: 0 | DISCHARGE

## 2019-07-06 RX ORDER — DILTIAZEM HCL 120 MG
1 CAPSULE, EXT RELEASE 24 HR ORAL
Qty: 0 | Refills: 0 | DISCHARGE

## 2019-07-06 RX ORDER — ATORVASTATIN CALCIUM 80 MG/1
1 TABLET, FILM COATED ORAL
Qty: 0 | Refills: 0 | DISCHARGE

## 2019-07-06 RX ORDER — BENAZEPRIL HYDROCHLORIDE 40 MG/1
1 TABLET ORAL
Qty: 0 | Refills: 0 | DISCHARGE

## 2020-08-14 NOTE — ED ADULT NURSE NOTE - NSFALLRSKINDICATORS_ED_ALL_ED
no PD insertion site still open, but non-draining. Patient performs packing and dressing changes himself.   - Wound care consult  - daily dressing changes

## 2021-05-29 NOTE — ED PROVIDER NOTE - NS_EDPROVIDERDISPOUSERTYPE_ED_A_ED
[de-identified] : Sinus rhythm.  Early repolarization abnormality.  This is a normal variant.
Attending Attestation (For Attendings USE Only)...

## 2023-03-15 NOTE — ED PROVIDER NOTE - CRITICAL CARE ATTESTATION
Addended by: ROWENA ROBBINS on: 3/15/2023 08:01 AM     Modules accepted: Orders     I have personally provided the amount of critical care time documented below excluding time spent on separate procedures

## 2024-06-13 NOTE — PATIENT PROFILE ADULT - NSPROHMSYMPCOND_GEN_A_NUR
She has not been physically seen in the office since May 2023.  She really should do an office visit.  Perhaps a Thursday evening would work for her   cardiovascular